# Patient Record
Sex: FEMALE | Race: WHITE | NOT HISPANIC OR LATINO
[De-identification: names, ages, dates, MRNs, and addresses within clinical notes are randomized per-mention and may not be internally consistent; named-entity substitution may affect disease eponyms.]

---

## 2019-09-17 PROBLEM — Z00.00 ENCOUNTER FOR PREVENTIVE HEALTH EXAMINATION: Status: ACTIVE | Noted: 2019-09-17

## 2019-09-18 ENCOUNTER — APPOINTMENT (OUTPATIENT)
Dept: SURGERY | Facility: CLINIC | Age: 34
End: 2019-09-18
Payer: MEDICAID

## 2019-09-18 ENCOUNTER — OUTPATIENT (OUTPATIENT)
Dept: OUTPATIENT SERVICES | Facility: HOSPITAL | Age: 34
LOS: 1 days | Discharge: HOME | End: 2019-09-18

## 2019-09-18 VITALS
DIASTOLIC BLOOD PRESSURE: 80 MMHG | HEIGHT: 63 IN | BODY MASS INDEX: 28.53 KG/M2 | SYSTOLIC BLOOD PRESSURE: 130 MMHG | WEIGHT: 161 LBS

## 2019-09-18 DIAGNOSIS — R10.9 UNSPECIFIED ABDOMINAL PAIN: ICD-10-CM

## 2019-09-18 DIAGNOSIS — Z78.9 OTHER SPECIFIED HEALTH STATUS: ICD-10-CM

## 2019-09-18 DIAGNOSIS — Z82.49 FAMILY HISTORY OF ISCHEMIC HEART DISEASE AND OTHER DISEASES OF THE CIRCULATORY SYSTEM: ICD-10-CM

## 2019-09-18 DIAGNOSIS — Z87.442 PERSONAL HISTORY OF URINARY CALCULI: ICD-10-CM

## 2019-09-18 DIAGNOSIS — Z83.3 FAMILY HISTORY OF DIABETES MELLITUS: ICD-10-CM

## 2019-09-18 PROCEDURE — 99203 OFFICE O/P NEW LOW 30 MIN: CPT

## 2019-09-25 PROBLEM — Z78.9 NON-SMOKER: Status: ACTIVE | Noted: 2019-09-18

## 2019-09-25 PROBLEM — Z83.3 FAMILY HISTORY OF DIABETES MELLITUS: Status: ACTIVE | Noted: 2019-09-18

## 2019-09-25 PROBLEM — Z87.442 HISTORY OF KIDNEY STONES: Status: ACTIVE | Noted: 2019-09-18

## 2019-09-25 PROBLEM — Z82.49 FAMILY HISTORY OF CARDIAC DISORDER: Status: ACTIVE | Noted: 2019-09-18

## 2019-09-25 PROBLEM — Z78.9 CAFFEINE USE: Status: ACTIVE | Noted: 2019-09-18

## 2019-09-25 NOTE — HISTORY OF PRESENT ILLNESS
[de-identified] : Chan is a 34  year old lady with a abdominal pain. The pain is not very characteristic. She has epigastric and right lower abdominal pain.

## 2019-09-25 NOTE — ASSESSMENT
[FreeTextEntry1] : Chan is a 34  year old lady with a abdominal pain. The pain is not very characteristic. She has epigastric and right lower abdominal pain. \par \par We explained in great detail the pathophysiology of the disease process. We shaquille diagrams and discussed the workup for diagnosis and management.\par The Procedure was explained to the patient. The Risk , benefit and alternatives were discussed. We discussed recovery and possible complications.\par The Post operative care was explained to the patient. She was counselled on diet , exercise and wound care.\par We discussed the pathology and surgery with her.\par \par We will get stool for h.pylori.\par We will get us for gallbladder\par We will get CT for abdominal pain. \par She will then follow up with me. \par

## 2019-09-25 NOTE — PHYSICAL EXAM
[JVD] : no jugular venous distention  [Respiratory Effort] : normal respiratory effort [Purpura] : no purpura  [Alert] : alert [Calm] : calm [de-identified] : Normal [de-identified] : Normal [de-identified] : Normal. Epigastric pain and right lower abdominal pain tenderness

## 2019-10-03 ENCOUNTER — FORM ENCOUNTER (OUTPATIENT)
Age: 34
End: 2019-10-03

## 2019-10-04 ENCOUNTER — OUTPATIENT (OUTPATIENT)
Dept: OUTPATIENT SERVICES | Facility: HOSPITAL | Age: 34
LOS: 1 days | Discharge: HOME | End: 2019-10-04
Payer: MEDICAID

## 2019-10-04 DIAGNOSIS — R10.9 UNSPECIFIED ABDOMINAL PAIN: ICD-10-CM

## 2019-10-04 LAB
ALBUMIN SERPL ELPH-MCNC: 5.1 G/DL
ALP BLD-CCNC: 71 U/L
ALT SERPL-CCNC: 14 U/L
ANION GAP SERPL CALC-SCNC: 14 MMOL/L
AST SERPL-CCNC: 13 U/L
BASOPHILS # BLD AUTO: 0.04 K/UL
BASOPHILS NFR BLD AUTO: 0.4 %
BILIRUB DIRECT SERPL-MCNC: <0.2 MG/DL
BILIRUB INDIRECT SERPL-MCNC: >0 MG/DL
BILIRUB SERPL-MCNC: 0.2 MG/DL
BUN SERPL-MCNC: 10 MG/DL
CALCIUM SERPL-MCNC: 10 MG/DL
CHLORIDE SERPL-SCNC: 100 MMOL/L
CO2 SERPL-SCNC: 26 MMOL/L
CREAT SERPL-MCNC: 0.6 MG/DL
EOSINOPHIL # BLD AUTO: 0.1 K/UL
EOSINOPHIL NFR BLD AUTO: 0.9 %
GLUCOSE SERPL-MCNC: 103 MG/DL
HCT VFR BLD CALC: 39.7 %
HGB BLD-MCNC: 12.9 G/DL
IMM GRANULOCYTES NFR BLD AUTO: 0.4 %
LYMPHOCYTES # BLD AUTO: 2.41 K/UL
LYMPHOCYTES NFR BLD AUTO: 22.3 %
MAN DIFF?: NORMAL
MCHC RBC-ENTMCNC: 28.8 PG
MCHC RBC-ENTMCNC: 32.5 G/DL
MCV RBC AUTO: 88.6 FL
MONOCYTES # BLD AUTO: 0.57 K/UL
MONOCYTES NFR BLD AUTO: 5.3 %
NEUTROPHILS # BLD AUTO: 7.67 K/UL
NEUTROPHILS NFR BLD AUTO: 70.7 %
PLATELET # BLD AUTO: 360 K/UL
POTASSIUM SERPL-SCNC: 4 MMOL/L
PROT SERPL-MCNC: 8.1 G/DL
RBC # BLD: 4.48 M/UL
RBC # FLD: 13 %
SODIUM SERPL-SCNC: 140 MMOL/L
WBC # FLD AUTO: 10.83 K/UL

## 2019-10-04 PROCEDURE — 76705 ECHO EXAM OF ABDOMEN: CPT | Mod: 26

## 2019-10-04 PROCEDURE — 74177 CT ABD & PELVIS W/CONTRAST: CPT | Mod: 26

## 2019-10-09 ENCOUNTER — APPOINTMENT (OUTPATIENT)
Dept: SURGERY | Facility: CLINIC | Age: 34
End: 2019-10-09
Payer: MEDICAID

## 2019-10-09 VITALS
HEIGHT: 63 IN | WEIGHT: 163 LBS | BODY MASS INDEX: 28.88 KG/M2 | SYSTOLIC BLOOD PRESSURE: 122 MMHG | DIASTOLIC BLOOD PRESSURE: 74 MMHG

## 2019-10-09 DIAGNOSIS — K82.4 CHOLESTEROLOSIS OF GALLBLADDER: ICD-10-CM

## 2019-10-09 PROCEDURE — 99214 OFFICE O/P EST MOD 30 MIN: CPT

## 2019-10-09 NOTE — PHYSICAL EXAM
[JVD] : no jugular venous distention  [Purpura] : no purpura  [Respiratory Effort] : normal respiratory effort [Alert] : alert [Calm] : calm [de-identified] : Normal. Epigastric pain and right lower abdominal pain tenderness [de-identified] : Normal [de-identified] : Normal

## 2019-10-09 NOTE — ASSESSMENT
[FreeTextEntry1] : Chan is a 34  year old lady with a abdominal pain. The pain is not very characteristic. She has epigastric and right lower abdominal pain. \par \par 9/18:  She was in the [facility], [place] ed where a non Contrast CT showed a large fecolith. We ordered a US and  CT.\par 10/9 : She has no pain now. US shows a Gallstones with normal CBD and CT scan shows a normal CT with gallstones. She also has umbilical hernia. \par \par We explained in great detail the pathophysiology of the disease process. We shaquille diagrams and discussed the workup for diagnosis and management.\par The Procedure was explained to the patient. The Risk , benefit and alternatives were discussed. We discussed recovery and possible complications.\par The Post operative care was explained to the patient. She was counselled on diet , exercise and wound care.\par We discussed the pathology and surgery with her.\par \par  stool for h.pylori. - not done\par US for gallbladder  - done - Cholelithiasis and 0.4 m gb polyp\par CT for abdominal pain - done - umbilical hernia - normal appendix\par \par Impression :\par Cholelithiasis\par Gallbladery polyp\par FEcalith - risk of appendicits discussed. She wants to wait. \par \par She will then follow up with me in 6 months with repeat US.\par \par We explained in great detail the pathophysiology of the disease process. We shaquille diagrams and discussed the workup for diagnosis and management.\par \par We discussed the importance of close follow up. \par We informed that she needs to follow up in 6 months\par We also informed that she can call us if anything changes or has any questions.\par \par

## 2020-04-08 ENCOUNTER — APPOINTMENT (OUTPATIENT)
Dept: SURGERY | Facility: CLINIC | Age: 35
End: 2020-04-08

## 2023-12-29 ENCOUNTER — NON-APPOINTMENT (OUTPATIENT)
Age: 38
End: 2023-12-29